# Patient Record
Sex: FEMALE | Race: WHITE | NOT HISPANIC OR LATINO | Employment: FULL TIME | URBAN - METROPOLITAN AREA
[De-identification: names, ages, dates, MRNs, and addresses within clinical notes are randomized per-mention and may not be internally consistent; named-entity substitution may affect disease eponyms.]

---

## 2020-11-17 ENCOUNTER — OFFICE VISIT (OUTPATIENT)
Dept: GASTROENTEROLOGY | Facility: CLINIC | Age: 55
End: 2020-11-17

## 2020-11-17 VITALS
DIASTOLIC BLOOD PRESSURE: 81 MMHG | WEIGHT: 146.4 LBS | HEIGHT: 62 IN | BODY MASS INDEX: 26.94 KG/M2 | TEMPERATURE: 97.1 F | SYSTOLIC BLOOD PRESSURE: 121 MMHG | HEART RATE: 106 BPM

## 2020-11-17 DIAGNOSIS — R19.7 DIARRHEA, UNSPECIFIED TYPE: ICD-10-CM

## 2020-11-17 DIAGNOSIS — K85.90 ACUTE PANCREATITIS, UNSPECIFIED COMPLICATION STATUS, UNSPECIFIED PANCREATITIS TYPE: Primary | ICD-10-CM

## 2020-11-17 DIAGNOSIS — Z12.11 SCREEN FOR COLON CANCER: ICD-10-CM

## 2020-11-17 DIAGNOSIS — R14.0 BLOATING: ICD-10-CM

## 2020-11-17 PROCEDURE — 99204 OFFICE O/P NEW MOD 45 MIN: CPT | Performed by: NURSE PRACTITIONER

## 2020-11-17 RX ORDER — FLUOXETINE HYDROCHLORIDE 20 MG/1
1 CAPSULE ORAL DAILY
COMMUNITY
Start: 2020-10-30

## 2020-11-17 RX ORDER — BACLOFEN 20 MG/1
1 TABLET ORAL AS NEEDED
COMMUNITY
Start: 2020-10-30

## 2020-11-17 RX ORDER — ERGOCALCIFEROL 1.25 MG/1
1 CAPSULE ORAL DAILY
COMMUNITY
Start: 2020-10-30

## 2020-11-17 RX ORDER — PANTOPRAZOLE SODIUM 20 MG/1
1 TABLET, DELAYED RELEASE ORAL DAILY
COMMUNITY
Start: 2020-11-02

## 2020-11-17 RX ORDER — ESTRADIOL 0.05 MG/D
1 PATCH, EXTENDED RELEASE TRANSDERMAL DAILY
COMMUNITY
Start: 2020-09-21

## 2020-11-17 NOTE — PROGRESS NOTES
GASTROENTEROLOGY OFFICE NOTE  Deena Thomas  [id number]  1965    CARE TEAM  Patient Care Team:  Collett, Melissa Candis, APRN as PCP - General (Family Medicine)    Referring Provider: Collett, Melissa Candis, APRN    Chief Complaint   Patient presents with   • Diarrhea   • Heartburn        HISTORY OF PRESENT ILLNESS:  Ms. Thomas is a 55-year-old female who presents today with complaints of chronic bloating and gas and bouts of watery diarrhea.  She reports that this has been an ongoing problem for her for many years.  She recently saw her primary care physician (11/2/2020) with worsening complaints of bloating and a pressure feeling in her epigastric region.  Work-up included a celiac panel that was negative, positive antibody for H. pylori, and noted to have a lipase elevated at 556 with normal liver enzymes AST 18/ALT 17/. She was diagnosed with acute pancreatitis.  She was advised to adhere to a clear liquid diet which she tried to do but admits that she cheated some however, her pain has subsided.  This is her first diagnosis of pancreatitis, etiology not determined.    Today she is seeking care mostly for chronic issues of bloating, gas and diarrhea.  She reports that she has had problems with her stomach for many many years with these complaints.  She apparently had food allergy testing at one point and was found to have multiple allergies including wheat, flour, berries, cheeses which admittedly she does not avoid routinely.  She reports that she feels that she eats healthy for the most part but has a sweet tooth and is terrible about eating large amounts of sugar and she feels this may be contributing to her symptoms.  She complains of bloating on a daily basis but is definitely worsened by high sugar intake as well as carbonated beverages.  She has bouts of watery diarrhea depending on what she eats but otherwise has a soft formed stool daily, nonbloody.    PAST MEDICAL HISTORY  Past Medical  History:   Diagnosis Date   • Anxiety and depression    • GERD (gastroesophageal reflux disease)    • Menopause         PAST SURGICAL HISTORY  Past Surgical History:   Procedure Laterality Date   • CHOLECYSTECTOMY     • FULGURATION ENDOMETRIOSIS     • TONSILLECTOMY     • TUBAL ABDOMINAL LIGATION          MEDICATIONS:    Current Outpatient Medications:   •  baclofen (LIORESAL) 20 MG tablet, Take 1 tablet by mouth As Needed., Disp: , Rfl:   •  Zoya 0.05 MG/24HR patch, Place 1 patch on the skin as directed by provider Daily., Disp: , Rfl:   •  FLUoxetine (PROzac) 20 MG capsule, Take 1 capsule by mouth Daily., Disp: , Rfl:   •  pantoprazole (PROTONIX) 20 MG EC tablet, Take 1 tablet by mouth Daily., Disp: , Rfl:   •  progesterone (PROMETRIUM) 200 MG capsule, Take 1 capsule by mouth Daily., Disp: , Rfl:   •  vitamin D (ERGOCALCIFEROL) 1.25 MG (92984 UT) capsule capsule, Take 1 Units by mouth Daily., Disp: , Rfl:     ALLERGIES  Allergies   Allergen Reactions   • Tetracyclines & Related Hives       FAMILY HISTORY:  Family History   Problem Relation Age of Onset   • Colon polyps Mother    • Colon cancer Neg Hx        SOCIAL HISTORY  Social History     Socioeconomic History   • Marital status:      Spouse name: Not on file   • Number of children: Not on file   • Years of education: Not on file   • Highest education level: Not on file   Tobacco Use   • Smoking status: Never Smoker   • Smokeless tobacco: Never Used   Substance and Sexual Activity   • Alcohol use: Never     Frequency: Never   • Drug use: Never   • Sexual activity: Defer       REVIEW OF SYSTEMS  Review of Systems   Constitutional: Negative for activity change, appetite change, chills, diaphoresis, fatigue, fever, unexpected weight gain and unexpected weight loss.   HENT: Negative for congestion, dental problem, drooling, ear discharge, ear pain, facial swelling, hearing loss, mouth sores, nosebleeds, postnasal drip, rhinorrhea, sinus pressure, sneezing,  "sore throat, swollen glands, tinnitus, trouble swallowing and voice change.    Respiratory: Negative for apnea, cough, choking, chest tightness, shortness of breath, wheezing and stridor.    Cardiovascular: Negative for chest pain, palpitations and leg swelling.   Gastrointestinal: Positive for abdominal distention, abdominal pain and diarrhea. Negative for anal bleeding, blood in stool, constipation, nausea, rectal pain, vomiting, GERD and indigestion.   Endocrine: Negative for cold intolerance, heat intolerance, polydipsia, polyphagia and polyuria.   Musculoskeletal: Negative for arthralgias, back pain, gait problem, joint swelling, myalgias, neck pain, neck stiffness and bursitis.   Skin: Negative for color change, dry skin, rash and skin lesions.   Allergic/Immunologic: Negative for environmental allergies, food allergies and immunocompromised state.   Neurological: Negative for dizziness, tremors, seizures, syncope, facial asymmetry, speech difficulty, weakness, light-headedness, numbness, headache, memory problem and confusion.   Hematological: Negative for adenopathy. Does not bruise/bleed easily.   Psychiatric/Behavioral: Negative for agitation, behavioral problems, decreased concentration, dysphoric mood, hallucinations, self-injury, sleep disturbance, suicidal ideas, negative for hyperactivity, depressed mood and stress. The patient is not nervous/anxious.          PHYSICAL EXAM   /81 (BP Location: Right arm, Patient Position: Sitting, Cuff Size: Adult)   Pulse 106   Temp 97.1 °F (36.2 °C) (Temporal)   Ht 157.5 cm (62\")   Wt 66.4 kg (146 lb 6.4 oz)   BMI 26.78 kg/m²   Physical Exam  Vitals signs and nursing note reviewed.   Constitutional:       Appearance: Normal appearance. She is well-developed.   HENT:      Head: Normocephalic and atraumatic.      Nose: Nose normal.      Mouth/Throat:      Mouth: Mucous membranes are moist.      Pharynx: Oropharynx is clear.   Eyes:      Pupils: Pupils are " equal, round, and reactive to light.   Neck:      Musculoskeletal: Normal range of motion and neck supple.   Cardiovascular:      Rate and Rhythm: Normal rate and regular rhythm.   Pulmonary:      Effort: Pulmonary effort is normal.      Breath sounds: Normal breath sounds. No wheezing or rales.   Abdominal:      General: Bowel sounds are normal.      Palpations: Abdomen is soft. There is no mass.      Tenderness: There is no abdominal tenderness. There is no guarding or rebound.      Hernia: No hernia is present.   Musculoskeletal: Normal range of motion.   Skin:     General: Skin is warm and dry.   Neurological:      Mental Status: She is alert and oriented to person, place, and time.      Cranial Nerves: No cranial nerve deficit.   Psychiatric:         Behavior: Behavior normal.         Judgment: Judgment normal.           Results Review:  [facility] records reviewed and discussed with patient.     ASSESSMENT / PLAN  1.  Acute pancreatitis  Appears clinically resolved  -Lipid panel  2.  Bloating  -FODMAP diet  -EGD  2.  Screen for colon cancer  -Colonoscopy for colorectal cancer screening    Return for Follow up after procedures.    I discussed the patients findings and my recommendations with patient    ANNALISA Wu

## 2020-12-10 RX ORDER — SODIUM, POTASSIUM,MAG SULFATES 17.5-3.13G
2 SOLUTION, RECONSTITUTED, ORAL ORAL TAKE AS DIRECTED
Qty: 354 ML | Refills: 0 | Status: SHIPPED | OUTPATIENT
Start: 2020-12-10

## 2020-12-21 ENCOUNTER — OUTSIDE FACILITY SERVICE (OUTPATIENT)
Dept: GASTROENTEROLOGY | Facility: CLINIC | Age: 55
End: 2020-12-21

## 2020-12-21 PROCEDURE — 45385 COLONOSCOPY W/LESION REMOVAL: CPT | Performed by: INTERNAL MEDICINE

## 2020-12-21 PROCEDURE — 43239 EGD BIOPSY SINGLE/MULTIPLE: CPT | Performed by: INTERNAL MEDICINE

## 2020-12-21 PROCEDURE — 45380 COLONOSCOPY AND BIOPSY: CPT | Performed by: INTERNAL MEDICINE

## 2021-01-08 ENCOUNTER — OFFICE VISIT (OUTPATIENT)
Dept: GASTROENTEROLOGY | Facility: CLINIC | Age: 56
End: 2021-01-08

## 2021-01-08 VITALS
BODY MASS INDEX: 27.82 KG/M2 | HEIGHT: 62 IN | DIASTOLIC BLOOD PRESSURE: 58 MMHG | SYSTOLIC BLOOD PRESSURE: 148 MMHG | WEIGHT: 151.2 LBS | HEART RATE: 87 BPM | TEMPERATURE: 97.9 F

## 2021-01-08 DIAGNOSIS — R14.0 BLOATING: ICD-10-CM

## 2021-01-08 DIAGNOSIS — K58.0 IRRITABLE BOWEL SYNDROME WITH DIARRHEA: Primary | ICD-10-CM

## 2021-01-08 PROCEDURE — 99213 OFFICE O/P EST LOW 20 MIN: CPT | Performed by: NURSE PRACTITIONER

## 2021-01-08 RX ORDER — ATORVASTATIN CALCIUM 10 MG/1
1 TABLET, FILM COATED ORAL DAILY
COMMUNITY
Start: 2020-12-28

## 2021-01-08 RX ORDER — COLESEVELAM 180 1/1
1250 TABLET ORAL 2 TIMES DAILY WITH MEALS
Qty: 120 TABLET | Refills: 11 | Status: SHIPPED | OUTPATIENT
Start: 2021-01-08

## 2021-01-08 RX ORDER — DICYCLOMINE HYDROCHLORIDE 10 MG/1
10 CAPSULE ORAL AS NEEDED
Qty: 30 CAPSULE | Refills: 1 | Status: SHIPPED | OUTPATIENT
Start: 2021-01-08 | End: 2021-05-18 | Stop reason: SDUPTHER

## 2021-01-08 NOTE — PROGRESS NOTES
GASTROENTEROLOGY OFFICE NOTE  Deena Thomas  [id number]  1965    CARE TEAM  Patient Care Team:  Collett, Melissa Candis, APRN as PCP - General (Family Medicine)    Referring Provider: Collett, Melissa Candis, APRN    Chief Complaint   Patient presents with   • Bloated     COLON/EGD F/U   • Diarrhea        HISTORY OF PRESENT ILLNESS:  Ms. Thomas returns in follow-up status post EGD and colonoscopy for complaints of diarrhea, bloating and dyspeptic symptoms.  EGD and colonoscopy were both unremarkable.  A hyperplastic polyp was removed from the colon.  Recommendation for repeat colonoscopy again in 5 years given patient's family history of colon polyps.    The patient reports that she has seen much improvement in her bloating and dyspeptic symptoms following a FODMAP diet however, she continues to have intermittent watery diarrhea.  She reports that some days she may not have diarrhea at all but at least 3-4 times weekly she is having watery stools and when this occurs it is typically greater than 10 daily.  She has noted that it is largely related to what she eats.    PAST MEDICAL HISTORY  Past Medical History:   Diagnosis Date   • Anxiety and depression    • GERD (gastroesophageal reflux disease)    • Menopause         PAST SURGICAL HISTORY  Past Surgical History:   Procedure Laterality Date   • CHOLECYSTECTOMY     • FULGURATION ENDOMETRIOSIS     • TONSILLECTOMY     • TUBAL ABDOMINAL LIGATION          MEDICATIONS:    Current Outpatient Medications:   •  atorvastatin (LIPITOR) 10 MG tablet, Take 1 tablet by mouth Daily., Disp: , Rfl:   •  baclofen (LIORESAL) 20 MG tablet, Take 1 tablet by mouth As Needed., Disp: , Rfl:   •  colesevelam (Welchol) 625 MG tablet, Take 2 tablets by mouth 2 (Two) Times a Day With Meals., Disp: 120 tablet, Rfl: 11  •  dicyclomine (Bentyl) 10 MG capsule, Take 1 capsule by mouth As Needed (DIARRHEA)., Disp: 30 capsule, Rfl: 1  •  Zoya 0.05 MG/24HR patch, Place 1 patch on the skin as  "directed by provider Daily., Disp: , Rfl:   •  FLUoxetine (PROzac) 20 MG capsule, Take 1 capsule by mouth Daily., Disp: , Rfl:   •  pantoprazole (PROTONIX) 20 MG EC tablet, Take 1 tablet by mouth Daily., Disp: , Rfl:   •  progesterone (PROMETRIUM) 200 MG capsule, Take 1 capsule by mouth Daily., Disp: , Rfl:   •  sodium-potassium-magnesium sulfates (Suprep Bowel Prep Kit) 17.5-3.13-1.6 GM/177ML solution oral solution, Take 2 bottles by mouth Take As Directed. Do Not Eat The Day Before Your Procedure. Call [phone] for questions., Disp: 354 mL, Rfl: 0  •  vitamin D (ERGOCALCIFEROL) 1.25 MG (88085 UT) capsule capsule, Take 1 Units by mouth Daily., Disp: , Rfl:     ALLERGIES  Allergies   Allergen Reactions   • Tetracyclines & Related Hives       FAMILY HISTORY:  Family History   Problem Relation Age of Onset   • Colon polyps Mother    • Colon cancer Neg Hx        SOCIAL HISTORY  Social History     Socioeconomic History   • Marital status:      Spouse name: Not on file   • Number of children: Not on file   • Years of education: Not on file   • Highest education level: Not on file   Tobacco Use   • Smoking status: Never Smoker   • Smokeless tobacco: Never Used   Substance and Sexual Activity   • Alcohol use: Never     Frequency: Never   • Drug use: Never   • Sexual activity: Defer       REVIEW OF SYSTEMS  Review of Systems   Constitutional: Negative for activity change, appetite change, chills, diaphoresis, fatigue, fever, unexpected weight gain and unexpected weight loss.   HENT: Negative for trouble swallowing and voice change.    Gastrointestinal: Positive for diarrhea. Negative for abdominal distention, abdominal pain, anal bleeding, blood in stool, constipation, nausea, rectal pain, vomiting, GERD and indigestion.         PHYSICAL EXAM   /58 (BP Location: Right arm, Patient Position: Sitting, Cuff Size: Adult)   Pulse 87   Temp 97.9 °F (36.6 °C) (Temporal)   Ht 157.5 cm (62.01\")   Wt 68.6 kg (151 " lb 3.2 oz)   BMI 27.65 kg/m²   Physical Exam  Vitals signs and nursing note reviewed.   Constitutional:       Appearance: Normal appearance. She is well-developed.   HENT:      Head: Normocephalic and atraumatic.      Nose: Nose normal.      Mouth/Throat:      Mouth: Mucous membranes are moist.      Pharynx: Oropharynx is clear.   Eyes:      Pupils: Pupils are equal, round, and reactive to light.   Neck:      Musculoskeletal: Normal range of motion and neck supple.   Cardiovascular:      Rate and Rhythm: Normal rate and regular rhythm.   Pulmonary:      Effort: Pulmonary effort is normal.      Breath sounds: Normal breath sounds. No wheezing or rales.   Abdominal:      General: Bowel sounds are normal.      Palpations: Abdomen is soft. There is no mass.      Tenderness: There is no abdominal tenderness. There is no guarding or rebound.      Hernia: No hernia is present.   Musculoskeletal: Normal range of motion.   Skin:     General: Skin is warm and dry.   Neurological:      Mental Status: She is alert and oriented to person, place, and time.      Cranial Nerves: No cranial nerve deficit.   Psychiatric:         Behavior: Behavior normal.         Judgment: Judgment normal.       Results Review:  I have reviewed the patient's new clinical results.    ASSESSMENT / PLAN  1.  IBS D  2.  Bloating  -WelChol 625 mg, start with 2 tabs twice daily with meals and titrate to effectiveness  -Dicyclomine 10 mg as needed    Return in about 6 months (around 7/8/2021).    I discussed the patients findings and my recommendations with patient    ANNALISA Wu

## 2021-05-18 RX ORDER — DICYCLOMINE HYDROCHLORIDE 10 MG/1
10 CAPSULE ORAL AS NEEDED
Qty: 30 CAPSULE | Refills: 2 | Status: SHIPPED | OUTPATIENT
Start: 2021-05-18

## 2024-03-08 ENCOUNTER — OFFICE VISIT (OUTPATIENT)
Dept: GASTROENTEROLOGY | Facility: CLINIC | Age: 59
End: 2024-03-08
Payer: COMMERCIAL

## 2024-03-08 VITALS
DIASTOLIC BLOOD PRESSURE: 70 MMHG | WEIGHT: 149 LBS | BODY MASS INDEX: 27.42 KG/M2 | HEIGHT: 62 IN | HEART RATE: 93 BPM | TEMPERATURE: 96.9 F | SYSTOLIC BLOOD PRESSURE: 129 MMHG

## 2024-03-08 DIAGNOSIS — R10.31 RIGHT LOWER QUADRANT ABDOMINAL PAIN: Primary | ICD-10-CM

## 2024-03-08 DIAGNOSIS — R13.19 ESOPHAGEAL DYSPHAGIA: ICD-10-CM

## 2024-03-08 RX ORDER — ACETAMINOPHEN 160 MG
1 TABLET,DISINTEGRATING ORAL DAILY
COMMUNITY
Start: 2024-01-11

## 2024-03-08 RX ORDER — CYANOCOBALAMIN 1000 UG/ML
INJECTION, SOLUTION INTRAMUSCULAR; SUBCUTANEOUS
COMMUNITY
Start: 2024-01-10

## 2024-03-08 RX ORDER — DICYCLOMINE HYDROCHLORIDE 10 MG/1
10 CAPSULE ORAL 2 TIMES DAILY
Qty: 180 CAPSULE | Refills: 2 | Status: SHIPPED | OUTPATIENT
Start: 2024-03-08

## 2024-03-08 NOTE — PROGRESS NOTES
GASTROENTEROLOGY OFFICE NOTE  Deena Thomas  [id number]  1965    CARE TEAM  Patient Care Team:  Collett, Melissa Candis, APRN as PCP - General (Family Medicine)    Referring Provider: Collett, Melissa Candis, APRN    Chief Complaint   Patient presents with    Follow-up        HISTORY OF PRESENT ILLNESS:  Patient is a 58-year-old female who presents today with complaints of difficulty swallowing.  She is having intermittent troubles with swallowing solid foods.  She is not actually had to bring any food back up but she has had several episodes of things getting stuck for a few seconds or more at a time before passing out through.  She is having particular troubles with breads and meats.    She complains of right lower quadrant abdominal pain that occurs intermittently.  The pain is very low in the pelvic region.  She has been to see her gynecologist and had a negative evaluation.  Typically occurs only when she has a bout of diarrhea this does not occur very frequently.  She has used dicyclomine in the past for lower abdominal pain and it was very beneficial.  She has not been taking this for quite some time.    PAST MEDICAL HISTORY  Past Medical History:   Diagnosis Date    Anxiety and depression     GERD (gastroesophageal reflux disease)     Menopause         PAST SURGICAL HISTORY  Past Surgical History:   Procedure Laterality Date    CHOLECYSTECTOMY      FULGURATION ENDOMETRIOSIS      TONSILLECTOMY      TUBAL ABDOMINAL LIGATION          MEDICATIONS:    Current Outpatient Medications:     baclofen (LIORESAL) 20 MG tablet, Take 1 tablet by mouth As Needed., Disp: , Rfl:     cyanocobalamin 1000 MCG/ML injection, TAKE 1ML MONTHLY, Disp: , Rfl:     dicyclomine (Bentyl) 10 MG capsule, Take 1 capsule by mouth 2 (Two) Times a Day., Disp: 180 capsule, Rfl: 2    FLUoxetine (PROzac) 20 MG capsule, Take 1 capsule by mouth Daily., Disp: , Rfl:     pantoprazole (PROTONIX) 20 MG EC tablet, Take 1 tablet by mouth Daily.,  "Disp: , Rfl:     vitamin D (ERGOCALCIFEROL) 1.25 MG (42847 UT) capsule capsule, Take 1 Units by mouth Daily., Disp: , Rfl:     Vitamin D3 50 MCG (2000 UT) capsule, Take 1 capsule by mouth Daily., Disp: , Rfl:     atorvastatin (LIPITOR) 10 MG tablet, Take 1 tablet by mouth Daily. (Patient not taking: Reported on 3/8/2024), Disp: , Rfl:     colesevelam (Welchol) 625 MG tablet, Take 2 tablets by mouth 2 (Two) Times a Day With Meals. (Patient not taking: Reported on 3/8/2024), Disp: 120 tablet, Rfl: 11    Zoya 0.05 MG/24HR patch, Place 1 patch on the skin as directed by provider Daily. (Patient not taking: Reported on 3/8/2024), Disp: , Rfl:     progesterone (PROMETRIUM) 200 MG capsule, Take 1 capsule by mouth Daily. (Patient not taking: Reported on 3/8/2024), Disp: , Rfl:     sodium-potassium-magnesium sulfates (Suprep Bowel Prep Kit) 17.5-3.13-1.6 GM/177ML solution oral solution, Take 2 bottles by mouth Take As Directed. Do Not Eat The Day Before Your Procedure. Call [phone] for questions., Disp: 354 mL, Rfl: 0    ALLERGIES  Allergies   Allergen Reactions    Tetracyclines & Related Hives       FAMILY HISTORY:  Family History   Problem Relation Age of Onset    Colon polyps Mother     Colon cancer Neg Hx        SOCIAL HISTORY  Social History     Socioeconomic History    Marital status:    Tobacco Use    Smoking status: Never    Smokeless tobacco: Never   Vaping Use    Vaping status: Never Used   Substance and Sexual Activity    Alcohol use: Never    Drug use: Never    Sexual activity: Defer         PHYSICAL EXAM   /70 (BP Location: Right arm, Patient Position: Sitting, Cuff Size: Adult)   Pulse 93   Temp 96.9 °F (36.1 °C) (Temporal)   Ht 157.5 cm (62.01\")   Wt 67.6 kg (149 lb)   BMI 27.24 kg/m²   Physical Exam  Vitals and nursing note reviewed.   Constitutional:       Appearance: Normal appearance. She is well-developed.   HENT:      Head: Normocephalic and atraumatic.      Nose: Nose normal.     "  Mouth/Throat:      Mouth: Mucous membranes are moist.      Pharynx: Oropharynx is clear.   Eyes:      Pupils: Pupils are equal, round, and reactive to light.   Cardiovascular:      Rate and Rhythm: Normal rate and regular rhythm.   Pulmonary:      Effort: Pulmonary effort is normal.      Breath sounds: Normal breath sounds. No wheezing or rales.   Abdominal:      General: Bowel sounds are normal.      Palpations: Abdomen is soft. There is no mass.      Tenderness: There is no abdominal tenderness. There is no guarding or rebound.      Hernia: No hernia is present.   Musculoskeletal:         General: Normal range of motion.      Cervical back: Normal range of motion and neck supple.   Skin:     General: Skin is warm and dry.   Neurological:      Mental Status: She is alert and oriented to person, place, and time.      Cranial Nerves: No cranial nerve deficit.   Psychiatric:         Behavior: Behavior normal.         Judgment: Judgment normal.           ASSESSMENT / PLAN  1.  Right lower quadrant abdominal pain associated with diarrhea  - Dicyclomine 10 mg twice daily  2.  Intermittent dysphagia to solids  - EGD    No follow-ups on file.    I discussed the patients findings and my recommendations with patient    ANNALISA Wu

## 2024-04-18 ENCOUNTER — OUTSIDE FACILITY SERVICE (OUTPATIENT)
Dept: GASTROENTEROLOGY | Facility: CLINIC | Age: 59
End: 2024-04-18
Payer: COMMERCIAL

## 2024-04-18 PROCEDURE — 43239 EGD BIOPSY SINGLE/MULTIPLE: CPT | Performed by: INTERNAL MEDICINE

## 2024-04-18 PROCEDURE — 43249 ESOPH EGD DILATION <30 MM: CPT | Performed by: INTERNAL MEDICINE
